# Patient Record
Sex: MALE
[De-identification: names, ages, dates, MRNs, and addresses within clinical notes are randomized per-mention and may not be internally consistent; named-entity substitution may affect disease eponyms.]

---

## 2018-04-02 ENCOUNTER — HOSPITAL ENCOUNTER (EMERGENCY)
Dept: HOSPITAL 14 - H.ER | Age: 23
Discharge: HOME | End: 2018-04-02
Payer: MEDICAID

## 2018-04-02 VITALS
DIASTOLIC BLOOD PRESSURE: 63 MMHG | HEART RATE: 60 BPM | OXYGEN SATURATION: 100 % | SYSTOLIC BLOOD PRESSURE: 106 MMHG | RESPIRATION RATE: 20 BRPM | TEMPERATURE: 97.4 F

## 2018-04-02 VITALS — BODY MASS INDEX: 24.3 KG/M2

## 2018-04-02 DIAGNOSIS — Z20.2: ICD-10-CM

## 2018-04-02 DIAGNOSIS — J02.9: Primary | ICD-10-CM

## 2018-04-02 NOTE — ED PDOC
HPI: General Adult


Time Seen by Provider: 18 10:30


Chief Complaint (Nursing): Allergic Reaction


Chief Complaint (Provider): sore throat


History Per: Patient


Additional Complaint(s): 


22-year-old male presents with sore throat for 2 days. Patient states that 

throat feels inflamed and itchy. He is tolerating liquids and solids and denies 

shortness of breath. Patient is also requesting to be tested for gonorrhea and 

Chlamydia. Patient states during intercourse a couple days ago the condom 

broke. He denies any symptoms of any STD at this time. No dysuria, penile 

discharge, rash or lesions to genitalia.  No cough, fever or chills. 





PMD:  Dr. Pereira





Past Medical History


Reviewed: Historical Data, Nursing Documentation, Vital Signs


Vital Signs: 


 Last Vital Signs











Temp  97.4 F L  18 10:14


 


Pulse  60   18 10:14


 


Resp  20   18 10:14


 


BP  106/63   18 10:14


 


Pulse Ox  100   18 10:55














- Medical History


PMH: Asthma





- Surgical History


Surgical History: Hernia Repair





- Family History


Family History: States: No Known Family Hx





- Living Arrangements


Living Arrangements: With Family





- Social History


Current smoker - smoking cessation education provided: No


Alcohol: Social


Drugs: Denies





- Home Medications


Home Medications: 


 Ambulatory Orders











 Medication  Instructions  Recorded


 


Docusate [Colace] 100 mg PO DAILY #10 cap 17


 


Azithromycin [Zithromax] 250 mg PO DAILY #6 tab 18














- Allergies


Allergies/Adverse Reactions: 


 Allergies











Allergy/AdvReac Type Severity Reaction Status Date / Time


 


No Known Allergies Allergy   Verified 16 10:29














Review of Systems


ROS Statement: Except As Marked, All Systems Reviewed And Found Negative


Constitutional: Negative for: Fever


ENT: Positive for: Throat Pain.  Negative for: Nose Congestion


Cardiovascular: Negative for: Chest Pain


Respiratory: Negative for: Cough, Shortness of Breath


Gastrointestinal: Negative for: Nausea, Vomiting


Genitourinary Male: Negative for: Dysuria, Frequency, Incontinence, Hematuria, 

Penile Discharge, Scrotal Pain, Rash, Penile Pain





Physical Exam





- Reviewed


Nursing Documentation Reviewed: Yes


Vital Signs Reviewed: Yes





- Physical Exam


Appears: Positive for: Well, Non-toxic, No Acute Distress


Skin: Negative for: Rash


Eye Exam: Positive for: Normal appearance


ENT: Positive for: Pharyngeal Erythema, Tonsillar Exudate, Tonsillar Swelling.  

Negative for: Nasal Congestion


Cardiovascular/Chest: Positive for: Regular Rate, Rhythm


Respiratory: Positive for: Normal Breath Sounds


Gastrointestinal/Abdominal: Positive for: Soft.  Negative for: Tenderness, 

Distended, Guarding, Rebound


Back: Negative for: L CVA Tenderness, R CVA Tenderness


Extremity: Positive for: Normal ROM


Neurologic/Psych: Positive for: Alert, Oriented





- Laboratory Results


Urine dip results: Negative for: Leukocyte Esterase, Blood, Nitrate, Ketones, 

Glucose, Bilirubin, Protein





- ECG


O2 Sat by Pulse Oximetry: 100


Pulse Ox Interpretation: Normal





Medical Decision Making


Medical Decision Makin year old with sore throat





Plan:


Throat culture


CHL/GC culture


Urine culture


Urine dip





Patient given prescription for Zithromax, will treat throat infection 

empirically given clinical presentation. Throat culture was sent.  Patient 

instructed to take ibuprofen for pain as needed. He was advised to follow up 

with primary doctor in 2-3 days. 





Disposition





- Clinical Impression


Clinical Impression: 


 Pharyngitis, Possible exposure to STD








- Patient ED Disposition


Is Patient to be Admitted: No


Counseled Patient/Family Regarding: Studies Performed, Diagnosis, Need For 

Followup, Rx Given





- Disposition


Referrals: 


Myron Pereira MD [Primary Care Provider] - 


Disposition: Routine/Home


Disposition Time: 10:53


Condition: STABLE


Additional Instructions: 


Take rx meds as directed.  Over the counter ibuprofen for pain as needed.  

Contact ED in 2-3 days for culture results.  Follow up with primary care doctor.


Prescriptions: 


Azithromycin [Zithromax] 250 mg PO DAILY #6 tab


Instructions:  Sore Throat in Adults, Screening for Sexually Transmitted 

Infections


Forms:  [facility] (English)

## 2019-04-12 ENCOUNTER — HOSPITAL ENCOUNTER (EMERGENCY)
Dept: HOSPITAL 31 - C.ER | Age: 24
Discharge: HOME | End: 2019-04-12
Payer: MEDICAID

## 2019-04-12 VITALS
RESPIRATION RATE: 16 BRPM | TEMPERATURE: 98.1 F | SYSTOLIC BLOOD PRESSURE: 100 MMHG | HEART RATE: 71 BPM | DIASTOLIC BLOOD PRESSURE: 62 MMHG | OXYGEN SATURATION: 98 %

## 2019-04-12 VITALS — BODY MASS INDEX: 24.3 KG/M2

## 2019-04-12 DIAGNOSIS — L60.0: Primary | ICD-10-CM

## 2019-04-12 DIAGNOSIS — L08.89: ICD-10-CM

## 2019-04-12 NOTE — C.PDOC
History Of Present Illness


23 year old male presents to the ED for evaluation of bilateral toe pain and 

swelling over the past month. Patient now reports redness and minimal drainage 

from the area, prompting visit. He denies fever, chills, trauma, extremity 

numbness/weakness. 


Time Seen by Provider: 04/12/19 20:12


Chief Complaint (Nursing): Lower Extremity Problem/Injury


History Per: Patient


History/Exam Limitations: no limitations


Onset/Duration Of Symptoms: Other (one month )


Current Symptoms Are (Timing): Worse


Additional History Per: Patient





Past Medical History


Reviewed: Historical Data, Nursing Documentation, Vital Signs


Vital Signs: 





                                Last Vital Signs











Temp  98.1 F   04/12/19 19:53


 


Pulse  71   04/12/19 19:53


 


Resp  16   04/12/19 19:53


 


BP  100/62   04/12/19 19:53


 


Pulse Ox  98   04/12/19 19:53














- Medical History


PMH: Asthma


   Denies: Chronic Kidney Disease


Surgical History: Hernia Repair


Family History: States: Unknown Family Hx





- Social History


Hx Alcohol Use: Yes


Hx Substance Use: No





- Immunization History


Hx Tetanus Toxoid Vaccination: No


Hx Influenza Vaccination: No


Hx Pneumococcal Vaccination: No





Review Of Systems


Constitutional: Negative for: Fever, Chills


Musculoskeletal: Positive for: Other (pain, swelling, redness and minimal 

drainage to bilateral toes )


Neurological: Negative for: Weakness, Numbness





Physical Exam





- Physical Exam


Appears: Non-toxic, No Acute Distress


Skin: Warm, Dry


Extremity: Normal ROM, No Calf Tenderness, Capillary Refill (less than 2 seconds

), Other (ingrown toenails to medial aspects of bilateral great toes, left worse

than right. Left great toenail with yellow-colored moist deposit and 

granulomatous tissue at the border of the nail and erythema at the base of the 

nailbed. right ingrown great toenail with minimal erythema, no drainage. 

remainder of feet are unremarkable )


Pulses: Left Dorsalis Pedis: Normal, Right Dorsalis Pedis: Normal


Neurological/Psych: Normal Speech, Normal Cognition, Normal Motor, Normal Sensat

ion


Gait: Steady





ED Course And Treatment


O2 Sat by Pulse Oximetry: 98 (on RA)


Pulse Ox Interpretation: Normal


Progress Note: Keflex PO and Motrin PO given.  On reassessment, patient is 

resting comfortably, showing no signs of distress and is stable for discharge. 

Patient is advised to apply warm compressess to the area and advised to f/u with

podiatry within 1-2 days for further evaluation.





Disposition





- Disposition


Referrals: 


Charly Robles MD [Staff Provider] - 


[facility] [Outside]


Disposition: HOME/ ROUTINE


Disposition Time: 20:36


Condition: STABLE


Additional Instructions: 


Please follow up with Podiatry  





Take medications as directed





Return to ER if worse  








Prescriptions: 


Cephalexin [Keflex] 500 mg PO Q6 #28 capsule


Ibuprofen [Motrin] 600 mg PO Q6H #20 tab


Instructions:  Ingrown Toenail (DC)


Forms:  [facility] (English)





- Clinical Impression


Clinical Impression: 


 Ingrowing nail with infection








- PA / NP / Resident Statement


MD/DO has reviewed & agrees with the documentation as recorded.





- Scribe Statement


The provider has reviewed the documentation as recorded by the Scribe (Zarina Lynne)








All medical record entries made by the Scribe were at my direction and 

personally dictated by me. I have reviewed the chart and agree that the record 

accurately reflects my personal performance of the history, physical exam, 

medical decision making, and the department course for this patient. I have also

 personally directed, reviewed, and agree with the discharge instructions and 

disposition.